# Patient Record
Sex: FEMALE | Race: WHITE | NOT HISPANIC OR LATINO | Employment: UNEMPLOYED | URBAN - METROPOLITAN AREA
[De-identification: names, ages, dates, MRNs, and addresses within clinical notes are randomized per-mention and may not be internally consistent; named-entity substitution may affect disease eponyms.]

---

## 2017-01-01 ENCOUNTER — HOSPITAL ENCOUNTER (EMERGENCY)
Facility: HOSPITAL | Age: 47
Discharge: HOME | End: 2017-06-22
Attending: EMERGENCY MEDICINE
Payer: MEDICAID

## 2017-01-01 ENCOUNTER — HOSPITAL ENCOUNTER (OUTPATIENT)
Dept: ADMINISTRATIVE | Facility: HOSPITAL | Age: 47
End: 2017-03-29
Attending: INTERNAL MEDICINE | Admitting: INTERNAL MEDICINE

## 2017-01-01 ENCOUNTER — HOSPITAL ENCOUNTER (EMERGENCY)
Facility: HOSPITAL | Age: 47
Discharge: HOME | End: 2017-05-01
Attending: EMERGENCY MEDICINE
Payer: MEDICAID

## 2017-01-01 ENCOUNTER — HOSPITAL ENCOUNTER (EMERGENCY)
Facility: HOSPITAL | Age: 47
Discharge: HOME | End: 2017-07-11
Attending: EMERGENCY MEDICINE
Payer: MEDICAID

## 2017-01-01 ENCOUNTER — HOSPITAL ENCOUNTER (EMERGENCY)
Facility: HOSPITAL | Age: 47
Discharge: HOME | End: 2017-03-06
Attending: EMERGENCY MEDICINE
Payer: MEDICAID

## 2017-01-01 ENCOUNTER — HOSPITAL ENCOUNTER (EMERGENCY)
Facility: HOSPITAL | Age: 47
Discharge: HOME | End: 2017-03-09
Attending: EMERGENCY MEDICINE
Payer: MEDICAID

## 2017-01-01 VITALS
OXYGEN SATURATION: 99 % | BODY MASS INDEX: 23.86 KG/M2 | HEIGHT: 67 IN | WEIGHT: 152 LBS | DIASTOLIC BLOOD PRESSURE: 87 MMHG | RESPIRATION RATE: 12 BRPM | SYSTOLIC BLOOD PRESSURE: 128 MMHG | HEART RATE: 110 BPM

## 2017-01-01 VITALS
BODY MASS INDEX: 23.98 KG/M2 | RESPIRATION RATE: 16 BRPM | OXYGEN SATURATION: 97 % | HEIGHT: 67 IN | SYSTOLIC BLOOD PRESSURE: 143 MMHG | WEIGHT: 152.75 LBS | TEMPERATURE: 99 F | DIASTOLIC BLOOD PRESSURE: 99 MMHG | HEART RATE: 100 BPM

## 2017-01-01 VITALS
DIASTOLIC BLOOD PRESSURE: 72 MMHG | WEIGHT: 150 LBS | BODY MASS INDEX: 24.11 KG/M2 | RESPIRATION RATE: 16 BRPM | HEART RATE: 101 BPM | HEIGHT: 66 IN | SYSTOLIC BLOOD PRESSURE: 149 MMHG | OXYGEN SATURATION: 96 % | TEMPERATURE: 99 F

## 2017-01-01 VITALS
WEIGHT: 150 LBS | SYSTOLIC BLOOD PRESSURE: 120 MMHG | BODY MASS INDEX: 24.21 KG/M2 | OXYGEN SATURATION: 98 % | RESPIRATION RATE: 18 BRPM | DIASTOLIC BLOOD PRESSURE: 82 MMHG | TEMPERATURE: 97 F | HEART RATE: 74 BPM

## 2017-01-01 VITALS
HEIGHT: 66 IN | OXYGEN SATURATION: 99 % | BODY MASS INDEX: 24.11 KG/M2 | SYSTOLIC BLOOD PRESSURE: 131 MMHG | WEIGHT: 150 LBS | TEMPERATURE: 97 F | DIASTOLIC BLOOD PRESSURE: 91 MMHG | RESPIRATION RATE: 20 BRPM | HEART RATE: 96 BPM

## 2017-01-01 DIAGNOSIS — K29.00 ACUTE SUPERFICIAL GASTRITIS WITHOUT HEMORRHAGE: Primary | ICD-10-CM

## 2017-01-01 DIAGNOSIS — Z02.83 ENCOUNTER FOR DRUG SCREENING: Primary | ICD-10-CM

## 2017-01-01 DIAGNOSIS — F10.10 ALCOHOL ABUSE: Primary | ICD-10-CM

## 2017-01-01 DIAGNOSIS — S09.90XA HEAD INJURY, INITIAL ENCOUNTER: ICD-10-CM

## 2017-01-01 DIAGNOSIS — F10.929 ALCOHOL INTOXICATION, WITH UNSPECIFIED COMPLICATION: Primary | ICD-10-CM

## 2017-01-01 DIAGNOSIS — S00.83XA FACIAL CONTUSION, INITIAL ENCOUNTER: ICD-10-CM

## 2017-01-01 DIAGNOSIS — R07.9 CHEST PAIN: ICD-10-CM

## 2017-01-01 DIAGNOSIS — F10.920 ALCOHOL INTOXICATION, UNCOMPLICATED: Primary | ICD-10-CM

## 2017-01-01 LAB
ALBUMIN SERPL BCP-MCNC: 4 G/DL
ALBUMIN SERPL BCP-MCNC: 4.2 G/DL
ALP SERPL-CCNC: 103 U/L
ALP SERPL-CCNC: 110 U/L
ALT SERPL W/O P-5'-P-CCNC: 110 U/L
ALT SERPL W/O P-5'-P-CCNC: 81 U/L
AMPHET+METHAMPHET UR QL: NEGATIVE
AMPHET+METHAMPHET UR QL: NEGATIVE
ANION GAP SERPL CALC-SCNC: 15 MMOL/L
ANION GAP SERPL CALC-SCNC: 16 MMOL/L
AST SERPL-CCNC: 103 U/L
AST SERPL-CCNC: 56 U/L
B-HCG UR QL: NEGATIVE
BARBITURATES UR QL SCN>200 NG/ML: NEGATIVE
BARBITURATES UR QL SCN>200 NG/ML: NEGATIVE
BASOPHILS # BLD AUTO: 0 K/UL
BASOPHILS # BLD AUTO: 0 K/UL
BASOPHILS NFR BLD: 0.5 %
BASOPHILS NFR BLD: 0.5 %
BENZODIAZ UR QL SCN>200 NG/ML: NEGATIVE
BENZODIAZ UR QL SCN>200 NG/ML: NEGATIVE
BILIRUB SERPL-MCNC: 0.4 MG/DL
BILIRUB SERPL-MCNC: 0.7 MG/DL
BUN SERPL-MCNC: 11 MG/DL
BUN SERPL-MCNC: 12 MG/DL
BZE UR QL SCN: NEGATIVE
BZE UR QL SCN: NEGATIVE
CALCIUM SERPL-MCNC: 8.4 MG/DL
CALCIUM SERPL-MCNC: 9 MG/DL
CANNABINOIDS UR QL SCN: NEGATIVE
CANNABINOIDS UR QL SCN: NEGATIVE
CHLORIDE SERPL-SCNC: 103 MMOL/L
CHLORIDE SERPL-SCNC: 96 MMOL/L
CO2 SERPL-SCNC: 21 MMOL/L
CO2 SERPL-SCNC: 24 MMOL/L
CREAT SERPL-MCNC: 0.6 MG/DL
CREAT SERPL-MCNC: 0.6 MG/DL
CREAT UR-MCNC: 14.7 MG/DL
CREAT UR-MCNC: 25.4 MG/DL
CTP QC/QA: YES
DIFFERENTIAL METHOD: ABNORMAL
DIFFERENTIAL METHOD: ABNORMAL
EOSINOPHIL # BLD AUTO: 0 K/UL
EOSINOPHIL # BLD AUTO: 0 K/UL
EOSINOPHIL NFR BLD: 0.1 %
EOSINOPHIL NFR BLD: 0.2 %
ERYTHROCYTE [DISTWIDTH] IN BLOOD BY AUTOMATED COUNT: 13.6 %
ERYTHROCYTE [DISTWIDTH] IN BLOOD BY AUTOMATED COUNT: 14.1 %
EST. GFR  (AFRICAN AMERICAN): >60 ML/MIN/1.73 M^2
EST. GFR  (AFRICAN AMERICAN): >60 ML/MIN/1.73 M^2
EST. GFR  (NON AFRICAN AMERICAN): >60 ML/MIN/1.73 M^2
EST. GFR  (NON AFRICAN AMERICAN): >60 ML/MIN/1.73 M^2
ETHANOL SERPL-MCNC: 57 MG/DL
GLUCOSE SERPL-MCNC: 90 MG/DL
GLUCOSE SERPL-MCNC: 97 MG/DL
HCT VFR BLD AUTO: 39.9 %
HCT VFR BLD AUTO: 42 %
HGB BLD-MCNC: 13.8 G/DL
HGB BLD-MCNC: 15.1 G/DL
LIPASE SERPL-CCNC: 64 U/L
LYMPHOCYTES # BLD AUTO: 1.5 K/UL
LYMPHOCYTES # BLD AUTO: 1.7 K/UL
LYMPHOCYTES NFR BLD: 28.5 %
LYMPHOCYTES NFR BLD: 34.2 %
MCH RBC QN AUTO: 32.7 PG
MCH RBC QN AUTO: 34.7 PG
MCHC RBC AUTO-ENTMCNC: 34.6 %
MCHC RBC AUTO-ENTMCNC: 35.8 %
MCV RBC AUTO: 95 FL
MCV RBC AUTO: 97 FL
METHADONE UR QL SCN>300 NG/ML: NEGATIVE
METHADONE UR QL SCN>300 NG/ML: NEGATIVE
MONOCYTES # BLD AUTO: 0.2 K/UL
MONOCYTES # BLD AUTO: 0.5 K/UL
MONOCYTES NFR BLD: 3.6 %
MONOCYTES NFR BLD: 7.7 %
NEUTROPHILS # BLD AUTO: 2.7 K/UL
NEUTROPHILS # BLD AUTO: 3.8 K/UL
NEUTROPHILS NFR BLD: 61.5 %
NEUTROPHILS NFR BLD: 63.2 %
OPIATES UR QL SCN: NEGATIVE
OPIATES UR QL SCN: NEGATIVE
PCP UR QL SCN>25 NG/ML: NEGATIVE
PCP UR QL SCN>25 NG/ML: NEGATIVE
PLATELET # BLD AUTO: 262 K/UL
PLATELET # BLD AUTO: 316 K/UL
PMV BLD AUTO: 7.8 FL
PMV BLD AUTO: 8.4 FL
POTASSIUM SERPL-SCNC: 3.5 MMOL/L
POTASSIUM SERPL-SCNC: 3.8 MMOL/L
PROT SERPL-MCNC: 6.9 G/DL
PROT SERPL-MCNC: 7.3 G/DL
RBC # BLD AUTO: 4.21 M/UL
RBC # BLD AUTO: 4.35 M/UL
SODIUM SERPL-SCNC: 135 MMOL/L
SODIUM SERPL-SCNC: 140 MMOL/L
TOXICOLOGY INFORMATION: ABNORMAL
TOXICOLOGY INFORMATION: NORMAL
WBC # BLD AUTO: 4.3 K/UL
WBC # BLD AUTO: 6 K/UL

## 2017-01-01 PROCEDURE — 96375 TX/PRO/DX INJ NEW DRUG ADDON: CPT

## 2017-01-01 PROCEDURE — 80053 COMPREHEN METABOLIC PANEL: CPT

## 2017-01-01 PROCEDURE — 85025 COMPLETE CBC W/AUTO DIFF WBC: CPT

## 2017-01-01 PROCEDURE — 82570 ASSAY OF URINE CREATININE: CPT

## 2017-01-01 PROCEDURE — 93005 ELECTROCARDIOGRAM TRACING: CPT

## 2017-01-01 PROCEDURE — 96365 THER/PROPH/DIAG IV INF INIT: CPT

## 2017-01-01 PROCEDURE — 25500020 PHARM REV CODE 255

## 2017-01-01 PROCEDURE — 99284 EMERGENCY DEPT VISIT MOD MDM: CPT | Mod: 25

## 2017-01-01 PROCEDURE — 83690 ASSAY OF LIPASE: CPT

## 2017-01-01 PROCEDURE — 96361 HYDRATE IV INFUSION ADD-ON: CPT

## 2017-01-01 PROCEDURE — 99283 EMERGENCY DEPT VISIT LOW MDM: CPT

## 2017-01-01 PROCEDURE — 63600175 PHARM REV CODE 636 W HCPCS: Performed by: EMERGENCY MEDICINE

## 2017-01-01 PROCEDURE — 25000003 PHARM REV CODE 250: Performed by: EMERGENCY MEDICINE

## 2017-01-01 PROCEDURE — 25000003 PHARM REV CODE 250: Performed by: NURSE PRACTITIONER

## 2017-01-01 PROCEDURE — 63600175 PHARM REV CODE 636 W HCPCS: Performed by: NURSE PRACTITIONER

## 2017-01-01 PROCEDURE — 99285 EMERGENCY DEPT VISIT HI MDM: CPT | Mod: 25

## 2017-01-01 PROCEDURE — 80307 DRUG TEST PRSMV CHEM ANLYZR: CPT

## 2017-01-01 PROCEDURE — 36415 COLL VENOUS BLD VENIPUNCTURE: CPT

## 2017-01-01 PROCEDURE — 80320 DRUG SCREEN QUANTALCOHOLS: CPT

## 2017-01-01 PROCEDURE — 99284 EMERGENCY DEPT VISIT MOD MDM: CPT

## 2017-01-01 PROCEDURE — 81025 URINE PREGNANCY TEST: CPT | Performed by: EMERGENCY MEDICINE

## 2017-01-01 RX ORDER — ONDANSETRON 4 MG/1
4 TABLET, ORALLY DISINTEGRATING ORAL EVERY 8 HOURS PRN
Qty: 12 TABLET | Refills: 0 | Status: SHIPPED | OUTPATIENT
Start: 2017-01-01 | End: 2017-01-01

## 2017-01-01 RX ORDER — ESCITALOPRAM OXALATE 10 MG/1
20 TABLET ORAL DAILY
Status: DISCONTINUED | OUTPATIENT
Start: 2017-01-01 | End: 2017-01-01

## 2017-01-01 RX ORDER — ONDANSETRON 2 MG/ML
8 INJECTION INTRAMUSCULAR; INTRAVENOUS
Status: COMPLETED | OUTPATIENT
Start: 2017-01-01 | End: 2017-01-01

## 2017-01-01 RX ORDER — ESCITALOPRAM OXALATE 10 MG/1
20 TABLET ORAL DAILY
Status: DISCONTINUED | OUTPATIENT
Start: 2017-01-01 | End: 2017-01-01 | Stop reason: HOSPADM

## 2017-01-01 RX ORDER — KETOROLAC TROMETHAMINE 30 MG/ML
30 INJECTION, SOLUTION INTRAMUSCULAR; INTRAVENOUS
Status: COMPLETED | OUTPATIENT
Start: 2017-01-01 | End: 2017-01-01

## 2017-01-01 RX ORDER — LORAZEPAM 2 MG/ML
2 INJECTION INTRAMUSCULAR
Status: COMPLETED | OUTPATIENT
Start: 2017-01-01 | End: 2017-01-01

## 2017-01-01 RX ORDER — OMEPRAZOLE 20 MG/1
20 CAPSULE, DELAYED RELEASE ORAL DAILY
Qty: 30 CAPSULE | Refills: 0 | Status: SHIPPED | OUTPATIENT
Start: 2017-01-01 | End: 2017-01-01

## 2017-01-01 RX ORDER — ONDANSETRON 4 MG/1
8 TABLET, ORALLY DISINTEGRATING ORAL
Status: COMPLETED | OUTPATIENT
Start: 2017-01-01 | End: 2017-01-01

## 2017-01-01 RX ORDER — PANTOPRAZOLE SODIUM 40 MG/1
40 TABLET, DELAYED RELEASE ORAL
Status: COMPLETED | OUTPATIENT
Start: 2017-01-01 | End: 2017-01-01

## 2017-01-01 RX ORDER — ONDANSETRON 2 MG/ML
4 INJECTION INTRAMUSCULAR; INTRAVENOUS
Status: COMPLETED | OUTPATIENT
Start: 2017-01-01 | End: 2017-01-01

## 2017-01-01 RX ORDER — DIPHENHYDRAMINE HCL 50 MG
50 CAPSULE ORAL EVERY 6 HOURS PRN
COMMUNITY

## 2017-01-01 RX ADMIN — PANTOPRAZOLE SODIUM 40 MG: 40 TABLET, DELAYED RELEASE ORAL at 12:03

## 2017-01-01 RX ADMIN — LIDOCAINE HYDROCHLORIDE: 20 SOLUTION ORAL; TOPICAL at 02:03

## 2017-01-01 RX ADMIN — KETOROLAC TROMETHAMINE 30 MG: 30 INJECTION, SOLUTION INTRAMUSCULAR at 04:07

## 2017-01-01 RX ADMIN — ESCITALOPRAM 20 MG: 10 TABLET, FILM COATED ORAL at 05:07

## 2017-01-01 RX ADMIN — LIDOCAINE HYDROCHLORIDE: 20 SOLUTION ORAL; TOPICAL at 12:03

## 2017-01-01 RX ADMIN — SODIUM CHLORIDE 1000 ML: 0.9 INJECTION, SOLUTION INTRAVENOUS at 11:03

## 2017-01-01 RX ADMIN — ONDANSETRON 4 MG: 2 INJECTION INTRAMUSCULAR; INTRAVENOUS at 11:03

## 2017-01-01 RX ADMIN — ONDANSETRON 8 MG: 4 TABLET, ORALLY DISINTEGRATING ORAL at 12:03

## 2017-01-01 RX ADMIN — FOLIC ACID 1000 ML/HR: 5 INJECTION, SOLUTION INTRAMUSCULAR; INTRAVENOUS; SUBCUTANEOUS at 04:07

## 2017-01-01 RX ADMIN — FOLIC ACID: 5 INJECTION, SOLUTION INTRAMUSCULAR; INTRAVENOUS; SUBCUTANEOUS at 01:03

## 2017-01-01 RX ADMIN — ONDANSETRON 8 MG: 2 INJECTION INTRAMUSCULAR; INTRAVENOUS at 04:07

## 2017-01-01 RX ADMIN — IOHEXOL 75 ML: 350 INJECTION, SOLUTION INTRAVENOUS at 12:03

## 2017-01-01 RX ADMIN — LORAZEPAM 2 MG: 2 INJECTION, SOLUTION INTRAMUSCULAR; INTRAVENOUS at 11:03

## 2017-03-06 NOTE — ED AVS SNAPSHOT
[facility] [address]  [place] [address]               Nidia Clark   3/6/2017 10:06 AM   ED    Description:  Female : 1970   Department:  [facility]           Your Care was Coordinated By:     Provider Role From To    Ronal Perez MD Attending Provider 17 1041 --    Savana Chavez NP Nurse Practitioner 17 1038 --      Reason for Visit     Anxiety     Chest Pain     Vomiting     Diarrhea           Diagnoses this Visit        Comments    Alcohol intoxication, uncomplicated    -  Primary     Chest pain           ED Disposition     None           To Do List           [facility]     [facility] Nurse Care [facility] -  Assistance  Registered nurses in the [facility] provide clinical advisement, health education, appointment booking, and other advisory services.  Call for this free service at [phone].             Medications           Message regarding Medications     Verify the changes and/or additions to your medication regime listed below are the same as discussed with your clinician today.  If any of these changes or additions are incorrect, please notify your healthcare provider.        These medications were administered today        Dose Freq    sodium chloride 0.9% bolus 1,000 mL 1,000 mL ED 1 Time    Sig: Inject 1,000 mLs into the vein ED 1 Time.    Class: Normal    Route: Intravenous    lorazepam injection 2 mg 2 mg ED 1 Time    Sig: Inject 1 mL (2 mg total) into the vein ED 1 Time.    Class: Normal    Route: Intravenous    ondansetron injection 4 mg 4 mg ED 1 Time    Sig: Inject 4 mg into the vein ED 1 Time.    Class: Normal    Route: Intravenous    sodium chloride 0.9% 1,000 mL with mvi, adult no.4 with vit K 3,300 unit- 150 mcg/10 mL 10 mL, thiamine 100 mg, folic acid 1 mg infusion  Once    Sig: Inject into the vein once.    Class: Normal    Route: Intravenous    omnipaque 350 iohexol  350 mg iodine/mL      Notes to Pharmacy: Created by cabinet override      STOP taking these medications     diclofenac (VOLTAREN) 50 MG EC tablet Take 1 tablet (50 mg total) by mouth 3 (three) times daily.           Verify that the below list of medications is an accurate representation of the medications you are currently taking.  If none reported, the list may be blank. If incorrect, please contact your healthcare provider. Carry this list with you in case of emergency.           Current Medications     buprenorphine-naloxone (SUBOXONE) 8-2 mg Film Place 1 Film under the tongue once daily.     escitalopram (LEXAPRO) 10 MG tablet Take 20 mg by mouth once daily.     levothyroxine (SYNTHROID) 25 MCG tablet Take 50 mcg by mouth once daily.     lorazepam (ATIVAN) 1 MG tablet Take 0.5 tablets (0.5 mg total) by mouth every 6 (six) hours as needed for Anxiety.    lorazepam injection 2 mg Inject 1 mL (2 mg total) into the vein ED 1 Time.           Clinical Reference Information           Your Vitals Were     BP Pulse Temp Resp Weight SpO2    120/82 74 97.4 °F (36.3 °C) (Oral) 18 68 kg (150 lb) 98%    BMI                24.21 kg/m2          Allergies as of 3/6/2017        Reactions    Ciprofloxacin     Codeine     Dilaudid [Hydromorphone]     Morphine     Phenergan [Promethazine]       Immunizations Administered on Date of Encounter - 3/6/2017     None      ED Micro, Lab, POCT     Start Ordered       Status Ordering Provider    03/06/17 1152 03/06/17 1152    Once,   Status:  Canceled      Canceled     03/06/17 1054 03/06/17 1053  Lipase  STAT      Final result     03/06/17 1053 03/06/17 1053  Drug screen panel, emergency  Once      Final result     03/06/17 1053 03/06/17 1053  Ethanol  STAT      Final result     03/06/17 1052 03/06/17 1053  CBC auto differential  STAT      Final result     03/06/17 1052 03/06/17 1053  Comprehensive metabolic panel  STAT      Final result       ED Imaging Orders     Start Ordered        Status Ordering Provider    03/06/17 1054 03/06/17 1053  CT Abdomen Pelvis With Contrast  1 time imaging      Final result     03/06/17 1053 03/06/17 1053  X-Ray Chest AP Portable  1 time imaging      Final result         Discharge Instructions         Alcohol Addiction  Are you addicted to alcohol?    You may be addicted to alcohol if your drinking harms yourself or others or leads to other problems with your daily life.  The medical term for this is alcohol use disorder. Your health care provider may diagnose you with this disorder if you have at least two of the following problems within the span of a year:  · You drink alcohol in larger amounts or for a longer period than you intended.  · You frequently want to cut down or control alcohol use, or have frequently failed efforts to do so.  · You spend a lot of time getting alcohol, using alcohol, or recovering from alcohol use.  · You crave or have a strong desire or urge to drink alcohol.  · Ongoing alcohol use makes it hard for you to be responsible at work, school, or home.  · You continue to use alcohol even though you have had problems in relationships or social settings because of it.  · You give up or miss important social, work, or recreational activities because of alcohol use.  · You drink alcohol in situations in which it is physically unsafe, such as driving while intoxicated.  · You continue to drink alcohol despite knowing it has caused physical or emotional problems.  · You need more and more alcohol to get the same effects.  · You have withdrawal symptoms or use alcohol to avoid withdrawal symptoms.  Date Last Reviewed: 11/11/2014  © 6029-1040 [employer]. [address] [place] [address], [place] [address]. All rights reserved. This information is not intended as a substitute for professional medical care. Always follow your healthcare professional's instructions.          Discharge References/Attachments     ALCOHOL INTOXICATION (ENGLISH)       Smoking Cessation     If you would like to quit smoking:   You may be eligible for free services if you are a [place] resident and started smoking cigarettes before September 1, 1988.  Call the Smoking Cessation Trust (SCT) toll free at (663) 098-2881 or (456) 047-7499.   Call 1-800-QUIT-NOW if you do not meet the above criteria.             [facility] complies with applicable Federal civil rights laws and does not discriminate on the basis of race, color, national origin, age, disability, or sex.        Language Assistance Services     ATTENTION: Language assistance services are available, free of charge. Please call [phone].      ATENCIÓN: Si habla español, tiene a seymour disposición servicios gratuitos de asistencia lingüística. Llame al [phone].     CHÚ Ý: N?u b?n nói Ti?ng Vi?t, có các d?ch v? h? tr? ngôn ng? mi?n phí dành cho b?n. G?i s? [phone].

## 2017-03-06 NOTE — ED PROVIDER NOTES
"Encounter Date: 3/6/2017       History     Chief Complaint   Patient presents with    Anxiety     binge drinking for 4 days, has not taken her meds    Chest Pain    Vomiting    Diarrhea     Review of patient's allergies indicates:   Allergen Reactions    Ciprofloxacin     Codeine     Dilaudid [hydromorphone]     Morphine     Phenergan [promethazine]      HPI Comments: Nidia Clark is a 46 year old female with pmh anxiety, asthma, depression, pancreatitis presenting to the ED with c/o abdominal pain, vomiting, and diarrhea. The patient states that she was trying to "get off suboxone" and is a recovering alcoholic. She reports binge drinking an unknown amount of alcohol for the past 4-5 days. Her last alcoholic drink was "sometime early yesterday evening". She is requesting "something for this nervous, shaky feeling I have inside". She reports being off of all of her prescribed medications since she began drinking. She states she has an appointment with a psychiatrist on 3-16-17 and is requesting refills of her medications. She denies hallucinations, delusions.     The history is provided by the patient.     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    HYSTERECTOMY  2003    TONSILLECTOMY       Family History   Problem Relation Age of Onset    Arthritis Mother     COPD Father     Depression Brother     Arthritis Maternal Grandmother     Depression Maternal Grandmother     Hypertension Maternal Grandmother     Cancer Maternal Grandfather      Braiin Ca    Hypertension Maternal Grandfather     Cancer Paternal Grandfather      Throat , Prostate     Social History   Substance Use " Topics    Smoking status: Current Every Day Smoker     Packs/day: 0.50     Years: 28.00     Types: Cigarettes    Smokeless tobacco: None    Alcohol use Yes      Comment: daily     Review of Systems   Constitutional: Positive for appetite change. Negative for chills and fever.   HENT: Negative.    Respiratory: Negative.    Cardiovascular: Negative.    Gastrointestinal: Positive for abdominal pain, diarrhea, nausea and vomiting. Negative for blood in stool.   Genitourinary: Negative.    Musculoskeletal: Negative.    Skin: Negative.    Neurological: Negative.    Psychiatric/Behavioral: The patient is nervous/anxious.        Physical Exam   Initial Vitals   BP Pulse Resp Temp SpO2   03/06/17 1009 03/06/17 1009 03/06/17 1009 03/06/17 1009 03/06/17 1009   117/89 76 16 97.4 °F (36.3 °C) 100 %     Physical Exam    Nursing note and vitals reviewed.  Constitutional: She appears well-developed and well-nourished. She is not diaphoretic. No distress.   HENT:   Head: Normocephalic and atraumatic.   Eyes: Conjunctivae are normal.   Neck: Normal range of motion.   Cardiovascular: Normal rate and regular rhythm.   Pulmonary/Chest: Breath sounds normal. No respiratory distress. She has no wheezes. She has no rhonchi. She has no rales.   Abdominal: Soft. Bowel sounds are normal. There is tenderness (upper abdomen). There is guarding.   Musculoskeletal: Normal range of motion.   Neurological: She is alert and oriented to person, place, and time.   Skin: Skin is warm and dry.   Psychiatric: She has a normal mood and affect.         ED Course   Procedures  Labs Reviewed   CBC W/ AUTO DIFFERENTIAL   COMPREHENSIVE METABOLIC PANEL   DRUG SCREEN PANEL, URINE EMERGENCY   ALCOHOL,MEDICAL (ETHANOL)   LIPASE     EKG Readings: (Independently Interpreted)   Rhythm: Normal Sinus Rhythm. Heart Rate: 70. Other Impression: NO evidence of acute ischemia-interpreted by Dr. Perez                APC / Resident Notes:   Nidia Clark is a 46 year old  "female presenting to the ED with vomiting/diarrhea after large amount of ETOH intake over the past 4 days. She had normal BP and was not tachycardic while in the ED. She had no seizure activity and I do not think that she is in withdrawal or at risk for DTs. She requested medication for "her nerves" and was given a dose of IV Ativan. She continued to request additional medication and also requested refills of her medications. She was informed that we were unable to refill this medication and that she should follow up with her psychiatrist or PCP for refills. There were no acute findings on abdominal CT. I do not think that admission is necessary at this time. I discussed the patient's case with Dr. Perez and he also spoke with the patient regarding her medications. I discussed specific return precautions with the patient and she verbalized understanding. Based on my clinical evaluation, I do not appreciate any immediate, emergent, or life threatening condition or etiology that warrants additional workup today and feel that the patient can be discharged with close follow up care.                 ED Course     Clinical Impression:   The primary encounter diagnosis was Alcohol intoxication, uncomplicated. A diagnosis of Chest pain was also pertinent to this visit.    Disposition:   Disposition: Discharged  Condition: Stable       Savana Chavez NP  03/06/17 1903    "

## 2017-03-08 NOTE — ED AVS SNAPSHOT
[facility] [address]  [place] [address]               Nidia Clark   3/8/2017 11:13 PM   ED    Description:  Female : 1970   Department:  [facility]           Your Care was Coordinated By:     Provider Role From To    Chris Carrizales MD Attending Provider 17 [phone] --      Reason for Visit     Alcohol Problem           Diagnoses this Visit        Comments    Acute superficial gastritis without hemorrhage    -  Primary       ED Disposition     None           To Do List           Follow-up Information     Follow up with Fredi Beltre MD. Schedule an appointment as soon as possible for a visit in 3 days.    Specialty:  Family Medicine    Why:  return to the ED, If symptoms worsen    Contact information:    [address]  [address]  [facility] [address]  [phone]         These Medications        Disp Refills Start End    omeprazole (PRILOSEC) 20 MG capsule 30 capsule 0 3/9/2017 2017    Take 1 capsule (20 mg total) by mouth once daily. - Oral    ondansetron (ZOFRAN-ODT) 4 MG TbDL 12 tablet 0 3/9/2017     Take 1 tablet (4 mg total) by mouth every 8 (eight) hours as needed (nausea/vomiting). - Oral      [facility] On Call     [facility] Care [facility] -  Assistance  Registered nurses in the [facility] provide clinical advisement, health education, appointment booking, and other advisory services.  Call for this free service at [phone].             Medications           Message regarding Medications     Verify the changes and/or additions to your medication regime listed below are the same as discussed with your clinician today.  If any of these changes or additions are incorrect, please notify your healthcare provider.        START taking these NEW medications        Refills    omeprazole (PRILOSEC) 20 MG capsule 0    Sig: Take 1 capsule (20 mg total) by mouth once  "daily.    Class: Print    Route: Oral    ondansetron (ZOFRAN-ODT) 4 MG TbDL 0    Sig: Take 1 tablet (4 mg total) by mouth every 8 (eight) hours as needed (nausea/vomiting).    Class: Print    Route: Oral      These medications were administered today        Dose Freq    ondansetron disintegrating tablet 8 mg 8 mg ED 1 Time    Sig: Take 2 tablets (8 mg total) by mouth ED 1 Time.    Class: Normal    Route: Oral    (pyxis) gi cocktail (mylanta 30 mL, lidocaine 2 % viscous 10 mL, dicyclomine 10 mL) 50 mL  ED 1 Time    Sig: Take by mouth ED 1 Time.    Class: Normal    Route: Oral    pantoprazole EC tablet 40 mg 40 mg ED 1 Time    Sig: Take 1 tablet (40 mg total) by mouth ED 1 Time.    Class: Normal    Route: Oral           Verify that the below list of medications is an accurate representation of the medications you are currently taking.  If none reported, the list may be blank. If incorrect, please contact your healthcare provider. Carry this list with you in case of emergency.           Current Medications     buprenorphine-naloxone (SUBOXONE) 8-2 mg Film Place 1 Film under the tongue once daily.     escitalopram (LEXAPRO) 10 MG tablet Take 20 mg by mouth once daily.     levothyroxine (SYNTHROID) 25 MCG tablet Take 50 mcg by mouth once daily.     lorazepam (ATIVAN) 1 MG tablet Take 0.5 tablets (0.5 mg total) by mouth every 6 (six) hours as needed for Anxiety.    omeprazole (PRILOSEC) 20 MG capsule Take 1 capsule (20 mg total) by mouth once daily.    ondansetron (ZOFRAN-ODT) 4 MG TbDL Take 1 tablet (4 mg total) by mouth every 8 (eight) hours as needed (nausea/vomiting).           Clinical Reference Information           Your Vitals Were     BP Pulse Temp Resp Height Weight    149/72 (BP Location: Right arm, Patient Position: Sitting) 101 98.6 °F (37 °C) (Oral) 16 5' 6" (1.676 m) 68 kg (150 lb)    SpO2 BMI             96% 24.21 kg/m2         Allergies as of 3/9/2017        Reactions    Ciprofloxacin     Codeine     " Dilaudid [Hydromorphone]     Morphine     Phenergan [Promethazine]       Immunizations Administered on Date of Encounter - 3/9/2017     None      ED Micro, Lab, POCT     None      ED Imaging Orders     None        Discharge Instructions         Gastritis (Adult)    Gastritis is inflammation and irritation of the stomach lining. It can be present for a short time (acute) or be long lasting (chronic). Gastritis is often caused by infection with bacteria called H pylori. More than a third of people in the [place] have this bacteria in their bodies. In many cases, H pylori causes no problems or symptoms. In some people, though, the infection irritates the stomach lining and causes gastritis. Other causes of stomach irritation include drinking alcohol or taking pain-relieving medicines called NSAIDs (such as aspirin or ibuprofen).   Symptoms of gastritis can include:  · Abdominal pain or bloating  · Loss of appetite  · Nausea or vomiting  · Vomiting blood or having black stools  · Feeling more tired than usual  An inflamed and irritated stomach lining is more likely to develop a sore called an ulcer. To help prevent this, gastritis should be treated.  Home care  If needed, medicines may be prescribed. If you have H pylori infection, treating it will likely relieve your symptoms. Other changes can help reduce stomach irritation and help it heal.  · If you have been prescribed medicines for H pylori infection, take them as directed. Take all of the medicine until it is finished or your healthcare provider tells you to stop, even if you feel better.  · Your healthcare provider may recommend avoiding NSAIDs. If you take daily aspirin for your heart or other medical reasons, do not stop without talking to your healthcare provider first.  · Avoid drinking alcohol.  · Stop smoking. Smoking can irritate the stomach and delay healing. As much as possible, stay away from second hand smoke.  Follow-up care  Follow up with your  healthcare provider, or as advised by our staff. Testing may be needed to check for inflammation or an ulcer.  When to seek medical advice  Call your healthcare provider for any of the following:  · Stomach pain that gets worse or moves to the lower right abdomen (appendix area)  · Chest pain that appears or gets worse, or spreads to the back, neck, shoulder, or arm  · Frequent vomiting (cant keep down liquids)  · Blood in the stool or vomit (red or black in color)  · Feeling weak or dizzy  · Fever of 100.4ºF (38ºC) or higher, or as directed by your healthcare provider  Date Last Reviewed: 6/22/2015 © 2000-2016 [employer]. [address] [place] [address], [place] [address]. All rights reserved. This information is not intended as a substitute for professional medical care. Always follow your healthcare professional's instructions.           [facility] complies with applicable Federal civil rights laws and does not discriminate on the basis of race, color, national origin, age, disability, or sex.        Language Assistance Services     ATTENTION: Language assistance services are available, free of charge. Please call [phone].      ATENCIÓN: Si habla español, tiene a seymour disposición servicios gratuitos de asistencia lingüística. Llame al [phone].     KASSIE Ý: N?u b?n nói Ti?ng Vi?t, có các d?ch v? h? tr? ngôn ng? mi?n phí dành cho b?n. G?i s? [phone].

## 2017-03-09 NOTE — ED NOTES
Pt resting in bed.  Appears to be sleeping.  Discharge paperwork at desk awaiting her departure.  Vitals stable at this time.

## 2017-03-09 NOTE — ED PROVIDER NOTES
"Encounter Date: 3/8/2017    SCRIBE #1 NOTE: IVeronica, darling scribing for, and in the presence of, Dr. Carrizales.       History     Chief Complaint   Patient presents with    Alcohol Problem     pt requests detox; c/o CP(EKG done on arrival)     Review of patient's allergies indicates:   Allergen Reactions    Ciprofloxacin     Codeine     Dilaudid [hydromorphone]     Morphine     Phenergan [promethazine]      HPI Comments: 03/09/2017  12:03 AM     Chief Complaint: Abd Pain      The patient is a 46 y.o. female with a PMHx of alcohol abuse; anxiety; arthritis; asthma; bowel obstruction; depression; and pancreatitis who is presenting with an acute onset of abd pain for the past 3 days. Her pain is exacerbated with movement. Associated symptom of nausea, profusely vomiting, diarrhea, and sleep disturbance due to pain. Pt seen here in ED earlier tonight for alcohol intoxication. She stated that she wants to "be comfortable to be able to get some sleep after 3 days of not being able to get comfortable." Pt has a past surgical history that includes Tonsillectomy; Appendectomy; Colon surgery; Hysterectomy; and Cosmetic surgery.      The history is provided by the patient and medical records.     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    HYSTERECTOMY  2003    TONSILLECTOMY       Family History   Problem Relation Age of Onset    Arthritis Mother     COPD Father     Depression Brother     Arthritis Maternal Grandmother     Depression Maternal Grandmother     Hypertension Maternal Grandmother     Cancer Maternal Grandfather      Braiin Ca    Hypertension Maternal Grandfather     " Cancer Paternal Grandfather      Throat , Prostate     Social History   Substance Use Topics    Smoking status: Current Every Day Smoker     Packs/day: 0.50     Years: 28.00     Types: Cigarettes    Smokeless tobacco: None    Alcohol use Yes      Comment: daily     Review of Systems   Constitutional: Negative for fever.   HENT: Negative for sore throat.    Eyes: Negative for visual disturbance.   Respiratory: Negative for shortness of breath.    Cardiovascular: Negative for chest pain.   Gastrointestinal: Positive for abdominal pain, diarrhea, nausea and vomiting.   Genitourinary: Negative for dysuria.   Musculoskeletal: Negative for back pain.   Skin: Negative for rash.   Neurological: Negative for weakness.   Hematological: Does not bruise/bleed easily.   Psychiatric/Behavioral: Positive for sleep disturbance (Sleep disturbance due to pain.).       Physical Exam   Initial Vitals   BP Pulse Resp Temp SpO2   03/08/17 2154 03/08/17 2154 03/08/17 2154 03/08/17 2154 03/08/17 2154   149/72 101 16 98.6 °F (37 °C) 96 %     Physical Exam    Nursing note and vitals reviewed.  Constitutional: She appears well-developed.   HENT:   Head: Normocephalic and atraumatic.   Mouth/Throat: Oropharynx is clear and moist.   Eyes: Conjunctivae and EOM are normal. Pupils are equal, round, and reactive to light.   Neck: Neck supple.   Cardiovascular: Normal rate, regular rhythm, normal heart sounds and intact distal pulses. Exam reveals no gallop and no friction rub.    No murmur heard.  Pulmonary/Chest: Breath sounds normal. She has no wheezes. She has no rhonchi. She has no rales.   Tenderness to left lower ribs.   Abdominal: Soft. She exhibits no distension. There is no tenderness.   Musculoskeletal: Normal range of motion.   Neurological: She is alert and oriented to person, place, and time. Gait normal.   Skin: No rash noted. No erythema.   Psychiatric: She has a normal mood and affect.         ED Course   Procedures  Labs  Reviewed - No data to display          Medical Decision Making:   Initial Assessment:   46-year-old female presents with a chief complaint of left upper quadrant/left lower rib pain.  Differential Diagnosis:   Differential diagnosis includes gastritis, pancreatitis, and alcohol intoxication.  ED Management:  The patient was urgently evaluated in the ED, her evaluation was significant for a middle-aged female with epigastric tenderness to palpation.  The patient had a recent CT scan and workup including labs for the same pain, which were negative.  The etiology for symptoms is likely gastritis, secondary to her alcohol use.  The patient was treated with by mouth Protonix, and by mouth GI cocktail, and ODT Zofran in the ED.  She is stable for discharge to home.  She has been instructed that she should stop her alcohol use.  She will be discharged home with by mouth Prilosec and she is to follow-up with her PCP for further care.              Scribe Attestation:   Scribe #1: I performed the above scribed service and the documentation accurately describes the services I performed. I attest to the accuracy of the note.    Attending Attestation:           Physician Attestation for Scribe:  Physician Attestation Statement for Scribe #1: I, Dr. Carrizales, reviewed documentation, as scribed by Veronica Morelos in my presence, and it is both accurate and complete.                 ED Course     Clinical Impression:   The encounter diagnosis was Acute superficial gastritis without hemorrhage.          Chris Carrizales MD  03/09/17 0608

## 2017-03-09 NOTE — DISCHARGE INSTRUCTIONS
Gastritis (Adult)    Gastritis is inflammation and irritation of the stomach lining. It can be present for a short time (acute) or be long lasting (chronic). Gastritis is often caused by infection with bacteria called H pylori. More than a third of people in the [place] have this bacteria in their bodies. In many cases, H pylori causes no problems or symptoms. In some people, though, the infection irritates the stomach lining and causes gastritis. Other causes of stomach irritation include drinking alcohol or taking pain-relieving medicines called NSAIDs (such as aspirin or ibuprofen).   Symptoms of gastritis can include:  · Abdominal pain or bloating  · Loss of appetite  · Nausea or vomiting  · Vomiting blood or having black stools  · Feeling more tired than usual  An inflamed and irritated stomach lining is more likely to develop a sore called an ulcer. To help prevent this, gastritis should be treated.  Home care  If needed, medicines may be prescribed. If you have H pylori infection, treating it will likely relieve your symptoms. Other changes can help reduce stomach irritation and help it heal.  · If you have been prescribed medicines for H pylori infection, take them as directed. Take all of the medicine until it is finished or your healthcare provider tells you to stop, even if you feel better.  · Your healthcare provider may recommend avoiding NSAIDs. If you take daily aspirin for your heart or other medical reasons, do not stop without talking to your healthcare provider first.  · Avoid drinking alcohol.  · Stop smoking. Smoking can irritate the stomach and delay healing. As much as possible, stay away from second hand smoke.  Follow-up care  Follow up with your healthcare provider, or as advised by our staff. Testing may be needed to check for inflammation or an ulcer.  When to seek medical advice  Call your healthcare provider for any of the following:  · Stomach pain that gets worse or moves to the lower  right abdomen (appendix area)  · Chest pain that appears or gets worse, or spreads to the back, neck, shoulder, or arm  · Frequent vomiting (cant keep down liquids)  · Blood in the stool or vomit (red or black in color)  · Feeling weak or dizzy  · Fever of 100.4ºF (38ºC) or higher, or as directed by your healthcare provider  Date Last Reviewed: 6/22/2015  © 6093-3682 [employer]. [address] [place] [address]. All rights reserved. This information is not intended as a substitute for professional medical care. Always follow your healthcare professional's instructions.

## 2017-03-09 NOTE — ED NOTES
Spoke with patient briefly.  She admits feeling horrible and wishing she was not at the hospital.  No acute distress.  Claims to be claustrophobic so the door is left open.  Vitals are stable.  Continuing to monitor.

## 2017-03-09 NOTE — ED NOTES
Pt remains very antsy and has made three trips to the restroom since being placed in the bed.  Continuing to reassure pt.

## 2017-05-01 NOTE — ED AVS SNAPSHOT
[facility] [address]  [place] [address]               Nidia Clark   2017  5:04 PM   ED    Description:  Female : 1970   Department:  [facility]           Your Care was Coordinated By:     Provider Role From To    Juan Manuel Moore MD Attending Provider 17 [id number] --      Reason for Visit     Alcohol Intoxication           Diagnoses this Visit        Comments    Alcohol abuse    -  Primary     Head injury, initial encounter         Facial contusion, initial encounter           ED Disposition     ED Disposition Condition Comment    Discharge             To Do List           Follow-up Information     Follow up with Fredi Beltre MD.    Specialty:  Family Medicine    Why:  3-5 days    Contact information:    [address] [facility]  [address] [place] [id number]  [phone]          Please follow up.    Why:  Also outpatient detox ASAP      [facility]     [facility] Nurse Care Line - 24/7 Assistance  Unless otherwise directed by your provider, please contact [facility], our nurse care line that is available for 24/7 assistance.     Registered nurses in the [facility] provide: appointment scheduling, clinical advisement, health education, and other advisory services.  Call: [phone] (toll free)               Medications           Message regarding Medications     Verify the changes and/or additions to your medication regime listed below are the same as discussed with your clinician today.  If any of these changes or additions are incorrect, please notify your healthcare provider.        STOP taking these medications     lorazepam (ATIVAN) 1 MG tablet Take 0.5 tablets (0.5 mg total) by mouth every 6 (six) hours as needed for Anxiety.    omeprazole (PRILOSEC) 20 MG capsule Take 1 capsule (20 mg total) by mouth once daily.           Verify that the below list of  "medications is an accurate representation of the medications you are currently taking.  If none reported, the list may be blank. If incorrect, please contact your healthcare provider. Carry this list with you in case of emergency.           Current Medications     buprenorphine-naloxone (SUBOXONE) 8-2 mg Film Place 1 Film under the tongue once daily.     escitalopram (LEXAPRO) 10 MG tablet Take 20 mg by mouth once daily.     levothyroxine (SYNTHROID) 25 MCG tablet Take 50 mcg by mouth once daily.     ondansetron (ZOFRAN-ODT) 4 MG TbDL Take 1 tablet (4 mg total) by mouth every 8 (eight) hours as needed (nausea/vomiting).           Clinical Reference Information           Your Vitals Were     BP Pulse Temp Resp Height Weight    131/91 (BP Location: Left arm, Patient Position: Lying) 96 97 °F (36.1 °C) (Axillary) 20 5' 6" (1.676 m) 68 kg (150 lb)    SpO2 BMI             99% 24.21 kg/m2         Allergies as of 5/1/2017        Reactions    Ciprofloxacin     Codeine     Dilaudid [Hydromorphone]     Morphine     Phenergan [Promethazine]       Immunizations Administered on Date of Encounter - 5/1/2017     None      ED Micro, Lab, POCT     Start Ordered       Status Ordering Provider    05/01/17 1713 05/01/17 1713  POCT urine pregnancy  Once      Final result       ED Imaging Orders     Start Ordered       Status Ordering Provider    05/01/17 1713 05/01/17 1713  CT Head Without Contrast  1 time imaging      Final result     05/01/17 1713 05/01/17 1713  CT Maxillofacial Without Contrast  1 time imaging      Final result       Discharge References/Attachments     ALCOHOL ABUSE (ENGLISH)    HEAD INJURY (ADULT) (ENGLISH)    CONTUSION, FACIAL (ENGLISH)      Smoking Cessation     If you would like to quit smoking:   You may be eligible for free services if you are a [place] resident and started smoking cigarettes before September 1, 1988.  Call the Smoking Cessation Trust (SCT) toll free at (645) 137-9649 or (598) " 835-6404.   Call 1-800-QUIT-NOW if you do not meet the above criteria.   Contact us via email: [email or web address]   View our website for more information: [email or web address]        National Suicide Prevention Lifeline     If you or someone you know is thinking about suicide, call the National Suicide Prevention Lifeline.  National Suicide Hotline: 6-756-722-TALK (5786)  The lifeline is free, confidential and always available.  Help a loved one, a friend or yourself.  www.suicideprevenetionlifeline.org           [facility] complies with applicable Federal civil rights laws and does not discriminate on the basis of race, color, national origin, age, disability, or sex.        Language Assistance Services     ATTENTION: Language assistance services are available, free of charge. Please call [phone].      ATENCIÓN: Si habla español, tiene a seymour disposición servicios gratuitos de asistencia lingüística. Llame al [phone].     CHÚ Ý: N?u b?n nói Ti?ng Vi?t, có các d?ch v? h? tr? ngôn ng? mi?n phí dành cho b?n. G?i s? [phone].

## 2017-05-01 NOTE — ED NOTES
Pt identifiers checked and correct.    LOC: The patient is awake, alert and aware of environment / + ETOH  APPEARANCE: Patient resting comfortably and in no acute distress, patient is clean and well groomed, patient's clothing is properly fastened.   SKIN: The skin is warm and dry, color consistent with ethnicity, patient has normal skin turgor and moist mucus membranes, skin intact, no breakdown or bruising noted.   MUSCULOSKELETAL: Patient moving all extremities spontaneously, no obvious swelling or deformities noted.   RESPIRATORY: Airway is open and patent, respirations are spontaneous, patient has a normal effort and rate, no accessory muscle use noted.   CARDIAC: Patient has a normal rate and regular rhythm, no periphreal edema noted, capillary refill < 3 seconds.   ABDOMEN: Soft and non tender to palpation, no distention noted, active bowel sounds present in all four quadrants.   NEUROLOGIC: PERRL, 3 mm bilaterally, eyes open spontaneously, behavior appropriate to situation, follows commands, facial expression symmetrical,speech slurred
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
## 2017-05-01 NOTE — ED NOTES
Family in room and talking with Dr Moore / pt became upset and walked out the ED own her own / family reports long history of ETOH abuse

## 2017-05-01 NOTE — ED NOTES
"Ambulatory to restroom w/ slight assistance from RN / pt not allowing assistance and stated " I can handle it "   "

## 2017-06-22 NOTE — ED NOTES
Patient identifiers for Nidia Clark checked and correct.  LOC: Patient is awake, alert, and aware of environment with an appropriate affect. Patient is oriented x 3 and speaking appropriately. Pt was sent requesting a drug screen.   APPEARANCE: Patient resting comfortably and in no acute distress. Patient is clean and well groomed, patient's clothing is properly fastened.  SKIN: The skin is warm and dry. Patient has normal skin turgor and moist mucus membrances. Skin is intact; no bruising or breakdown noted.  MUSCULOSKELETAL: Patient is moving all extremities well, no obvious deformities noted. Pulses intact.   RESPIRATORY: Airway is open and patent. Respirations are spontaneous and non-labored with normal effort and rate. Pt placed on continuous pulse ox.  CARDIAC: Patient has a normal rate and rhythm. No peripheral edema noted. Capillary refill < 3 seconds.   ABDOMEN: No distention noted. Bowel sounds active in all 4 quadrants. Soft and non-tender upon palpation.  NEUROLOGICAL: PERRL. Facial expression is symmetrical. Hand grasps are equal bilaterally. Normal sensation in all extremities when touched with finger.  Allergies reported:   Review of patient's allergies indicates:   Allergen Reactions    Ciprofloxacin     Codeine     Dilaudid [hydromorphone]     Morphine     Phenergan [promethazine]      Bed locked, lowest position. Call light within easy reach. Side rails up x2.

## 2017-06-22 NOTE — DISCHARGE INSTRUCTIONS
Patient tested negative on her urine drug screen in the emergency department.  Propylhexedrine is a structural analog of methamphetamine.  She was taking this for nasal decongestion and is likely what led to her false positive test.

## 2017-06-22 NOTE — ED PROVIDER NOTES
Encounter Date: 6/22/2017       History     Chief Complaint   Patient presents with    Other     Pt in post alcohol rehab facility and had urine drug screen tonight that was methamphetamine positive and got thrown out. Pt wants some kind of testing done here to prove she is not on methamphetamines.     HPI   Patient is a 47-year-old woman who is an alcohol rehabilitation facility and had a random urine drug screen performed tonight at the facility and tested positive for methamphetamine.  Patient adamantly denies abusing methamphetamines.  She does states that she has ALLERGIES and is taking a nasal decongestant Benzedrex.  Review of patient's allergies indicates:   Allergen Reactions    Ciprofloxacin     Codeine     Dilaudid [hydromorphone]     Morphine     Phenergan [promethazine]      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    HYSTERECTOMY  2003    TONSILLECTOMY       Family History   Problem Relation Age of Onset    Arthritis Mother     COPD Father     Depression Brother     Arthritis Maternal Grandmother     Depression Maternal Grandmother     Hypertension Maternal Grandmother     Cancer Maternal Grandfather      Braiin Ca    Hypertension Maternal Grandfather     Cancer Paternal Grandfather      Throat , Prostate     Social History   Substance Use Topics    Smoking status: Current Every Day Smoker     Packs/day: 0.50     Years: 28.00     Types: Cigarettes    Smokeless tobacco: Not on file    Alcohol use Yes      Comment: daily     Review of Systems  REVIEW OF SYSTEMS  CONSTITUTIONAL: Negative for fever.  HEENT:  Negative for sore throat.   HEART:   Negative for chest  pain..  LUNG:  Negative for shortness of breath.  ABDOMEN:  Negative for nausea.   :  No discharge, dysuria  EXTREMITIES:  No swelling  NEURO:  Negative for weakness.   SKIN:  Negative for rash.  Psych: No depression  HEME: Does not bruise/bleed easily.           Physical Exam     Initial Vitals [06/22/17 0047]   BP Pulse Resp Temp SpO2   (!) 143/99 100 16 98.8 °F (37.1 °C) 97 %     Physical Exam  Physical Exam   Nursing note and vitals reviewed.   Constitutional: Oriented to person, place, and time. Appears well-developed and well-nourished.   HENT:   Head: Normocephalic and atraumatic.   Eyes: EOM are normal.   Neck: Normal range of motion. Neck supple.   Cardiovascular: Normal rate.   Pulmonary/Chest: Effort normal. Clear BS b/l   Abdominal: Soft, Non tender, no distension.   Musculoskeletal: Normal range of motion.   Neurological:Alert and oriented to person, place, and time.   Psychiatric: Normal mood and affect. Behavior is normal.         ED Course   Procedures  Labs Reviewed   DRUG SCREEN PANEL, URINE EMERGENCY - Abnormal; Notable for the following:        Result Value    Creatinine, Random Ur 14.7 (*)     All other components within normal limits             Medical Decision Making:   History:   Old Medical Records: I decided to obtain old medical records.  Initial Assessment:   Review of patient's medications shows that she is taking Propylhexedrine. Propylhexedrine is a structural analog of methamphetamine and is likely reason she is testing positive for methamphetamines on her drug screen.  Repeat drug screen was performed in the emergency department and was negative.  Patient is discharged in no acute distress.                   ED Course     Clinical Impression:   The encounter diagnosis was Encounter for drug screening.                           Ronal Perez MD  06/22/17 0712

## 2017-07-11 NOTE — ED NOTES
Patient is attempting to leave the ER to go to parking lot and smoke. Patient is aware that [facility] is a non smoking facility.

## 2017-07-11 NOTE — ED NOTES
Patient is sitting on floor in hallway using phone, she has been requested to go back to her room.

## 2017-07-11 NOTE — ED NOTES
Patient sitting in room talking on phone, reporting to family that we took her shoes. Patient arrived to ED with only a red bag.

## 2017-07-11 NOTE — ED PROVIDER NOTES
Encounter Date: 7/11/2017    SCRIBE #1 NOTE: I, Reina Ortiz, am scribing for, and in the presence of, Dr Whitehead.       History     Chief Complaint   Patient presents with    Alcohol Intoxication     Slurred speech requests detox.     07/11/2017  3:55 PM     Chief Complaint: Alcohol Intoxication      Nidia Clark is a 47 y.o. female presenting to the E.D. Via EMS for alcohol intoxication. She states she relapsed from alcoholism several weeks ago following being sober for 4.5 years. Pt believes she may be dehydrated and is seeking IV fluids and also has complaints of nausea. She has no suicidal ideation or homicidal ideation and also denies auditory or visual hallucinations. Pmhx of Alcohol abuse; Bowel obstruction; Depression; and Pancreatitis.  Pt has a past surgical history that includes Tonsillectomy; Appendectomy (2002); Colon surgery (2002,2005); Hysterectomy (2003); and Cosmetic surgery (2011).        The history is provided by the patient and the EMS personnel.     Review of patient's allergies indicates:   Allergen Reactions    Ciprofloxacin     Codeine     Dilaudid [hydromorphone]     Morphine     Phenergan [promethazine]      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    HYSTERECTOMY  2003    TONSILLECTOMY       Family History   Problem Relation Age of Onset    Arthritis Mother     COPD Father     Depression Brother     Arthritis Maternal Grandmother     Depression Maternal Grandmother     Hypertension Maternal Grandmother     Cancer Maternal Grandfather      Braiin Ca    Hypertension Maternal Grandfather     Cancer Paternal Grandfather      Throat ,  Prostate     Social History   Substance Use Topics    Smoking status: Current Every Day Smoker     Packs/day: 0.50     Years: 28.00     Types: Cigarettes    Smokeless tobacco: Never Used    Alcohol use Yes      Comment: daily 1-2 liters of vodka.     Review of Systems   Constitutional: Negative for appetite change, chills, fatigue and fever.   Eyes: Negative for visual disturbance.   Respiratory: Negative for apnea and shortness of breath.    Cardiovascular: Negative for chest pain and palpitations.   Gastrointestinal: Positive for nausea. Negative for abdominal distention and abdominal pain.   Genitourinary: Negative for difficulty urinating and flank pain.   Musculoskeletal: Negative for gait problem and neck pain.   Skin: Negative for pallor and rash.   Neurological: Negative for weakness and headaches.   Hematological: Does not bruise/bleed easily.   Psychiatric/Behavioral: Negative for agitation and confusion.       Physical Exam     Initial Vitals [07/11/17 1434]   BP Pulse Resp Temp SpO2   128/87 110 12 -- 99 %      MAP       100.67         Physical Exam    Nursing note and vitals reviewed.  Constitutional: She appears well-developed and well-nourished.   HENT:   Head: Normocephalic and atraumatic.   Eyes: Pupils are equal, round, and reactive to light. Right eye exhibits nystagmus (horizontal). Left eye exhibits nystagmus (horizontal).   Injected sclera bilaterally.     Neck: Normal range of motion. Neck supple.   Cardiovascular: Regular rhythm, normal heart sounds and intact distal pulses. Tachycardia present.    Pulmonary/Chest: Effort normal and breath sounds normal. No respiratory distress.   Abdominal: Soft. She exhibits no distension. There is no tenderness.   Musculoskeletal: Normal range of motion.   Neurological: She is alert and oriented to person, place, and time.   Skin: Skin is warm and dry. No erythema.   Psychiatric: She has a normal mood and affect.         ED Course   Procedures  Labs  Reviewed   CBC W/ AUTO DIFFERENTIAL - Abnormal; Notable for the following:        Result Value    MCH 34.7 (*)     MPV 8.4 (*)     All other components within normal limits   COMPREHENSIVE METABOLIC PANEL - Abnormal; Notable for the following:     CO2 21 (*)     Calcium 8.4 (*)      (*)      (*)     All other components within normal limits             Medical Decision Making:   History:   Old Records Summarized: records from previous admission(s).  Clinical Tests:   Lab Tests: Ordered and Reviewed  ED Management:  Nidia Clark is a 47 y.o. female who presents in an intoxicated state requesting detox.  While awaiting approval from [facility] or other detox centers the patient eloped.  She denies any suicidal ideation and prior to eloping was clinically sober            Scribe Attestation:   Scribe #1: I performed the above scribed service and the documentation accurately describes the services I performed. I attest to the accuracy of the note.    Attending Attestation:           Physician Attestation for Scribe:  Physician Attestation Statement for Scribe #1: I, Dr Whitehead, reviewed documentation, as scribed by Reina Ortiz in my presence, and it is both accurate and complete.                 ED Course     Clinical Impression:   The encounter diagnosis was Alcohol intoxication, with unspecified complication.                           Facundo Whitehead III, MD  07/12/17 2684

## 2017-07-11 NOTE — ED NOTES
"Presents to the ER with c/o ETOH intoxication. Patient reports that she has been sober for the past 4 years and has recently "Fallen off the wagon." Associated complaints headache and dry mouth. Patient reports drinking 3 1/5 of vodka today. Mucous membranes are pink and dry. Skin is warm, dry and intact. Lungs are clear bilaterally, respirations are regular and unlabored. Denies cough, congestion, rhinorrhea or SOB. BS active x4, no tenderness with palpation, abd is soft and not distended. Denies any appetite or activity change. S1S2, capillary refill is < 2 seconds. Denies dysuria, difficulty urinating, frequency, numbness, tingling or weakness. KRIS LÓPEZ    "